# Patient Record
(demographics unavailable — no encounter records)

---

## 2025-05-08 NOTE — ASSESSMENT
[FreeTextEntry1] : 63 yo male with elevated PSA and abnormal prostate MRI is here for hospital follow up for right flank pain found to have a right ureteral calculus.   1 month of medical expulsive therapy.  - Sent prescription for tamsulosin 0.4 mg nightly.  - Sent prescription for ketorolac 10 mg TID PRN pain - Recommended patient try to achieve a daily water intake goal of 2-2.5 L Ordered KUB and renal US to reassess location of calculus and assess for hydronephrosis; patient to obtain this in 3-4 weeks.   Patient to complete course of cefpodoxime prescribed from ER.   Requested the patient bring lab documentation regarding his most recent PSA that he states was obtained outside of a [facility] lab. Again, discussed the importance of doing a prostate biopsy given his prostate MRI (08/28/2024) reports 2 lesions of interest with PIRADS 4. Patient understands.   Follow up in 1 month.

## 2025-05-08 NOTE — HISTORY OF PRESENT ILLNESS
[FreeTextEntry1] : 63 yo male with elevated PSA and abnormal prostate MRI is here for hospital follow up for a right ureteral calculus.   Patient presented to the ER on 04/27/2025 for right flank pain.  Documents, labs, and imaging reviewed.   Labs 04/27/2025: - UA: Negative nitrite, large leuk - Urine microscopy: 4 RBC/hpf, 278 WBC/hpf - Urine culture: > 100,000 Staphylococcus epidermidis  CT images reviewed -- distal right ureteral calculus. Official CT 04/27/2025 read:  - KIDNEYS/URETERS: No evidence of significant hydronephrosis bilaterally. The bilateral ureters are poorly visualized. In the right lower quadrant along the expected course of the right ureter, there is a 0.2 x 0.2 x 0.3 cm hyperdensity which may reflect obstructing calculus (4-241, maximum Hounsfield units of 1664). - BLADDER: Increased urinary bladder wall thickness, correlate with urinalysis. - REPRODUCTIVE ORGANS: Prostate gland is enlarged measuring 5.9 cm transverse diameter by 4.5 cm in AP diameter.  He was discharged home with tamsulosin, ketorolac, and cefpodoxime 200 mg BID x14 days.  Patient completed the prescribed 7 days of tamsulosin as well as the ketorolac.  He is continuing to take cefpodoxime. He reports that the right flank pain migrated to his right abdomen.  Since his ER visit, patient states that he has increased his daily water intake. Denies fever, nausea, vomiting, gross hematuria.  At his last visit we discussed the result of his prostate MRI showing 2 lesions of interest and PIRADS 4. We were coordinating a day to do a prostate biopsy, but patient states he decided not to go forward with the biopsy because another PSA was obtained after the most recent one documented in [facility] (08/02/2024 -- PSA 5.3) and was told by someone that because it decreased he should not do the prostate biopsy. So, he did not do the prostate biopsy.   Partial Purse String (Intermediate) Text: Given the location of the defect and the characteristics of the surrounding skin an intermediate purse string closure was deemed most appropriate.  Undermining was performed circumfirentially around the surgical defect.  A purse string suture was then placed and tightened. Wound tension only allowed a partial closure of the circular defect.

## 2025-06-11 NOTE — HISTORY OF PRESENT ILLNESS
[FreeTextEntry1] : 65 yo male with chronic pain on oxycontin is following up for right flank pain due to a 3 mm right ureteral calculus as well as for elevated PSA.  For the past month he was recommended for medical expulsive therapy with tamsulosin and high fluid hydration daily. He reports that the right flank pain resolved. Denies dysuria, gross hematuria, back pain, flank pain, fever, chills, nausea, vomiting.   At his last visit, a KUB and renal ultrasound were ordered to reassess location of the calculus and assess for hydronephrosis.  Images reviewed--Right ureteral calculus not identified. Renal US shows not hydronephrosis or calculi bilaterally.  Official KUB 06/05/2025 read:  - No definite evidence of right ureteral calculi. Left pelvic calculi are likely vascular in nature.  Official renal US 06/05/2025 read:  - Right kidney: 9.8 cm. No hydronephrosis or calculi.  - Left kidney: 10.2 cm. No hydronephrosis or calculi.   Regarding the elevated PSA, a repeat PSA (06/07/2025) was obtained.  Reviewed lab and discussed with patient.  PSA profile: - 06/07/2025 ([facility]): PSA 6.3; percent free PSA 14.4% - 08/02/2024 ([facility]): PSA 5.3; percent free PSA 14.7% - 06/05/2024 ([facility]): PSA 8.8; percent free PSA 34.5%  Past Prostate MRI 08/28/2024: -Volume 52 cc -PSA density 0.17 -Lesion #1: Right, posterior medial-lateral, base, peripheral zone; 11 mm; PI-RADS 4 -Lesion #2: Left, posterior medial, apex, peripheral zone; 4 mm; PI-RADS 4  To review, on a prior patient encounter, the prostate MRI result was discussed with the patient, and we discussed moving forward with a prostate biopsy.  As was noted on a prior encounter, we were coordinating a day to do a prostate biopsy, but along the process, the patient decided not to go forward with the biopsy and the procedure was cancelled. After today's discussion, he is now agreeable to proceed with a prostate biopsy.

## 2025-06-11 NOTE — ASSESSMENT
[FreeTextEntry1] : 65 yo male with chronic pain on oxycontin is following up for right flank pain due to a 3 mm right ureteral calculus as well as for elevated PSA.  Reviewed KUB and renal ultrasound images and discussed with patient as noted above. - Images indicate that likely the ureteral stone passed as the ureteral stone was not identified on the KUB and there was no hydronephrosis on the ultrasound.  Reviewed PSA labs and discussed with patient as noted above. Ordered prostate MRI. Recommended that patient obtain a prostate biopsy for evaluation of prostate cancer given persistent elevated PSA and prior prostate MRI shows 2 lesions PIRADs 4.   Informed patient that an updated prostate MRI should be obtained prior to the prostate biopsy.  Discussed fusion guided transperineal prostate biopsy procedure in detail with patient. He understands this will be done under anesthesia in the OR. Discussed risks of hematuria, hematospermia, and urinary retention. Patient is agreeable to proceed.   OR booking placed for fusion guided transperineal prostate biopsy. Updated prostate MRI to be obtained prior to biopsy.

## 2025-06-11 NOTE — HISTORY OF PRESENT ILLNESS
[FreeTextEntry1] : 63 yo male with chronic pain on oxycontin is following up for right flank pain due to a 3 mm right ureteral calculus as well as for elevated PSA.  For the past month he was recommended for medical expulsive therapy with tamsulosin and high fluid hydration daily. He reports that the right flank pain resolved. Denies dysuria, gross hematuria, back pain, flank pain, fever, chills, nausea, vomiting.   At his last visit, a KUB and renal ultrasound were ordered to reassess location of the calculus and assess for hydronephrosis.  Images reviewed--Right ureteral calculus not identified. Renal US shows not hydronephrosis or calculi bilaterally.  Official KUB 06/05/2025 read:  - No definite evidence of right ureteral calculi. Left pelvic calculi are likely vascular in nature.  Official renal US 06/05/2025 read:  - Right kidney: 9.8 cm. No hydronephrosis or calculi.  - Left kidney: 10.2 cm. No hydronephrosis or calculi.   Regarding the elevated PSA, a repeat PSA (06/07/2025) was obtained.  Reviewed lab and discussed with patient.  PSA profile: - 06/07/2025 ([facility]): PSA 6.3; percent free PSA 14.4% - 08/02/2024 ([facility]): PSA 5.3; percent free PSA 14.7% - 06/05/2024 ([facility]): PSA 8.8; percent free PSA 34.5%  Past Prostate MRI 08/28/2024: -Volume 52 cc -PSA density 0.17 -Lesion #1: Right, posterior medial-lateral, base, peripheral zone; 11 mm; PI-RADS 4 -Lesion #2: Left, posterior medial, apex, peripheral zone; 4 mm; PI-RADS 4  To review, on a prior patient encounter, the prostate MRI result was discussed with the patient, and we discussed moving forward with a prostate biopsy.  As was noted on a prior encounter, we were coordinating a day to do a prostate biopsy, but along the process, the patient decided not to go forward with the biopsy and the procedure was cancelled. After today's discussion, he is now agreeable to proceed with a prostate biopsy.